# Patient Record
(demographics unavailable — no encounter records)

---

## 2025-03-07 NOTE — ASSESSMENT
[FreeTextEntry1] : 65 year old woman with right hip/groin pain previous notes reviewed MRI lumbar spine with spinal stenosis, patient has tried spinal injections in the past will order MRI right hip, call patient with results continue gabapentin for neuropathic pain. Titration was explained to patient and patient expressed understanding: increase to 200 mg tonight, then 300 mg next week if tolerated. Monitor for sedation, dizziness and any signs of respiratory depression. tylenol as needed for pain, up to 3000 mg daily lidocaine patches continue PT, daily stretching at home

## 2025-03-07 NOTE — DATA REVIEWED
[MRI] : MRI [FreeTextEntry1] :  IMPRESSION:  *  Moderate to severe multilevel lumbar spondylosis, notable at L3-L4 and L4-L5 with severe spinal canal and severe foraminal narrowing.

## 2025-03-07 NOTE — PHYSICAL EXAM
[Normal] : Oriented to person, place, and time, insight and judgement were intact and the affect was normal [de-identified] : breathing comfortably  [de-identified] : warm, well perfused  [de-identified] : no pain with lumbar flexion, +tenderness buttocks, right greater trochanter bursa [de-identified] : ambulates with SC, left hip flexion/knee ext 4/5, decreased ROM right hip, pain with int/ext rotation, +FABERs, negative SLR  [de-identified] : reflexes symmetric, sensation intact

## 2025-03-07 NOTE — HISTORY OF PRESENT ILLNESS
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 She complains of severe pain in right hip, groin. Sometimes feels like crying due to pain. Felt in right hip, groin, posterior hip, to right thigh, knee. Has been going to PT, no relief. She takes gabapentin at night, tylenol as needed for pain. Has pain when standing for long periods, preparing foods, sitting. Feels right knee is weak, giving out. No falls reported. She had spine injections in the past, had bad experience. MRI lumbar spine 2/2025 with severe spinal stenosis, foraminal stenosis. Also with left foot/ankle stiffness.